# Patient Record
Sex: FEMALE | Race: WHITE | NOT HISPANIC OR LATINO | URBAN - METROPOLITAN AREA
[De-identification: names, ages, dates, MRNs, and addresses within clinical notes are randomized per-mention and may not be internally consistent; named-entity substitution may affect disease eponyms.]

---

## 2019-01-21 NOTE — IMPRESSION/PLAN
Impression: Type 2 diab w mild nonprlf diabetic rtnop w macular edema, right eye: M57.8740. OD Condition: improving. Symptoms: will continue to monitor. Plan: Discussed diagnosis in detail with patient. No treatment is required at this time. Discussed risks of progression. Emphasized blood sugar control. Will continue to observe condition and or symptoms. Call if [place] worsens.  OCT stable: decrease in CMT

## 2019-01-21 NOTE — IMPRESSION/PLAN
Impression: Type 2 diab w moderate nonprlf diab rtnop w macular edema, left eye: V83.9700. OS. Condition: established, stable. Vision: vision affected. Plan: Discussed diagnosis in detail with patient. No treatment is required at this time. Will continue to observe condition and or symptoms. Discussed risks of progression. Emphasized blood sugar control. OCT shows: stable OS.

## 2019-01-21 NOTE — IMPRESSION/PLAN
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 OD. Condition: established, stable. Vision: vision not affected. Plan: Discussed diagnosis in detail with patient. No treatment is required at this time, condition is not affecting the vision. Recommend observation for now.

## 2019-07-18 NOTE — IMPRESSION/PLAN
Impression: Type 2 diab w mild nonprlf diabetic rtnop w macular edema, right eye: P67.8335. OD Condition: improving. Symptoms: will continue to monitor. Plan: Discussed diagnosis in detail with patient. No treatment is required at this time. Discussed risks of progression. Emphasized blood sugar control. Will continue to observe condition and or symptoms. Call if [address] worsens.  OCT stable: decrease in CMT

## 2019-07-18 NOTE — IMPRESSION/PLAN
Impression: Type 2 diab w moderate nonprlf diab rtnop w macular edema, left eye: N02.0520. OS. Condition: established, stable. Vision: vision affected. Plan: Discussed diagnosis in detail with patient. No treatment is required at this time. Will continue to observe condition and or symptoms. Discussed risks of progression. Emphasized blood sugar control. OCT shows: stable OS.

## 2020-02-05 NOTE — IMPRESSION/PLAN
Impression: Type 2 diab w mild nonprlf diabetic rtnop w macular edema, right eye: W77.5622. OD Condition: stable. Vision: vision affected. Plan: Discussed diagnosis in detail with patient. No treatment is required at this time. Discussed risks of progression. Emphasized blood sugar control. Will continue to observe condition and or symptoms. Call if [address] worsens.  OCT stable: Stable no significant edema OD

## 2020-02-05 NOTE — IMPRESSION/PLAN
Impression: Type 2 diab w moderate nonprlf diab rtnop w macular edema, left eye: O88.5680. OS. Condition: established, stable. Vision: vision affected, stable. Plan: Discussed diagnosis in detail with patient. No treatment is required at this time. Will continue to observe condition and or symptoms. Discussed risks of progression. Emphasized blood sugar control. OCT shows: stable OS.

## 2020-08-19 NOTE — IMPRESSION/PLAN
Impression: Type 2 diab w mild nonprlf diabetic rtnop w macular edema, right eye: G63.0822. OD Condition: stable. Vision: vision affected. Plan: Due to Coronavirus COVID-19 pandemic and National Emergency, deferred Slit Lamp examination. Findings are based on OCT and Optos. OCT is stable mild ILM change, RP thickening centrally, decrease CMT and Optos shows stable no active stable ERM macular scar near fovea center. Based on diagnostic findings recommend a follow up in 6-8 months, sooner if there is a change or decrease in vision. Emphasized blood sugar control and advised to keep future appointments with PCP and/or Endocrinologist for the management of Diabetes.  Okay to schedule a refraction with optometrist.

## 2020-08-19 NOTE — IMPRESSION/PLAN
Impression: Type 2 diab w moderate nonprlf diab rtnop w macular edema, left eye: G06.4256. OS. Condition: established, stable. Vision: vision affected, stable. Plan: Due to Coronavirus COVID-19 pandemic and National Emergency, deferred Slit Lamp examination. Findings are based on OCT and Optos. OCT is stable OS and Optos shows stable no active edema or heme OS. Based on diagnostic findings recommend a follow up in 6-8 months, sooner if there is a change or decrease in vision. Emphasized blood sugar control and advised to keep future appointments with PCP and/or Endocrinologist for the management of Diabetes.  OKay to schedule a refraction with optometrist.

## 2021-04-30 NOTE — IMPRESSION/PLAN
Impression: Type 2 diab w moderate nonprlf diab rtnop w macular edema, left eye: F10.3607. OS. Condition: established, stable. Vision: vision affected, stable. Plan: Due to Coronavirus COVID-19 pandemic and National Emergency, deferred Slit Lamp examination. Findings are based on OCT and Optos. OCT and Optos shows no active edema - stable OS. Based on diagnostic findings recommend a follow up in 6 - 8 months, sooner if there is a change or decrease in vision. Emphasized blood sugar control and advised to keep future appointments with PCP and/or Endocrinologist for the management of Diabetes.

## 2021-04-30 NOTE — IMPRESSION/PLAN
Impression: Type 2 diab w mild nonprlf diabetic rtnop w macular edema, right eye: D20.6640. OD Condition: stable. Vision: vision affected. Plan: Due to Coronavirus COVID-19 pandemic and National Emergency, deferred Slit Lamp examination. Findings are based on OCT and Optos. OCT OD shows mild edema w/ILM change confirmed by Optos OD. Based on diagnostic findings recommend observation, follow up in 6 - 8 months, sooner if there is a change or decrease in vision. Emphasized blood sugar control and advised to keep future appointments with PCP and/or Endocrinologist for the management of Diabetes.

## 2021-11-10 NOTE — IMPRESSION/PLAN
Impression: Type 2 diab w moderate nonprlf diab rtnop w macular edema, left eye: D10.8511. OS. Condition: established, stable. Vision: vision affected, stable. s/p Map OS in 2015 Plan: Discussed diagnosis in detail with patient. Exam shows minimal Diabetic change OS. No treatment is recommended at this time. Emphasized blood sugar control and advised to keep future appointments with PCP and/or Endocrinologist for the management of Diabetes. Recommend observation for now. Will reassess the retina in 8 mos, sooner if there is a change or decrease in vision. OCT OS is stable, no edema and Optos OS shows no edema, focal laser tx - stable.

## 2021-11-10 NOTE — IMPRESSION/PLAN
Impression: Type 2 diab w mild nonprlf diabetic rtnop w macular edema, right eye: J81.5321. OD Condition: stable. Vision: vision affected. s/p Map OD in 2015 Plan: Discussed diagnosis in detail with patient. Exam shows minimal Diabetic change OD. No treatment is recommended at this time. Emphasized blood sugar control and advised to keep future appointments with PCP and/or Endocrinologist for the management of Diabetes. Recommend observation for now. Will reassess the retina in 8 mos, sooner if there is a change or decrease in vision. OCT shows mild edema w/ILM change and Optos OD shows mild edema w/ILM change, focal laser tx.

## 2022-07-26 NOTE — IMPRESSION/PLAN
Impression: Type 2 diab w mild nonprlf diabetic rtnop w macular edema, right eye: P38.6728. OD Condition: stable. Vision: vision affected. s/p Map OD in 2015 Plan: Discussed diagnosis in detail with patient. Exam shows no active Diabetic change OD. No treatment is recommended at this time. Emphasized blood sugar control and advised to keep future appointments with PCP and/or Endocrinologist for the management of Diabetes. Recommend observation for now. Will reassess the retina in 6 mos, sooner if there is a change or decrease in vision. OCT shows decrease in CMT - no edema OD and Optos OD shows previous focal laser tx, mop edema - stable.

## 2022-07-26 NOTE — IMPRESSION/PLAN
Impression: Type 2 diab w moderate nonprlf diab rtnop w macular edema, left eye: T82.1432. OS. Condition: established, stable. Vision: vision affected, stable. s/p Map OS in 2015 Plan: Discussed diagnosis in detail with patient. Exam shows no Diabetic change OS. No treatment is recommended at this time. Emphasized blood sugar control and advised to keep future appointments with PCP and/or Endocrinologist for the management of Diabetes. Recommend observation for now. Will reassess the retina in 6 mos, sooner if there is a change or decrease in vision. OCT OS is stable, no edema and Optos OS shows no edema, focal laser tx - stable.

## 2023-05-18 ENCOUNTER — OFFICE VISIT (OUTPATIENT)
Dept: URBAN - METROPOLITAN AREA CLINIC 23 | Facility: CLINIC | Age: 83
End: 2023-05-18
Payer: COMMERCIAL

## 2023-05-18 DIAGNOSIS — E11.3393 TYPE 2 DIAB WITH MOD NONP RTNOP WITHOUT MACULAR EDEMA, BI: Primary | ICD-10-CM

## 2023-05-18 PROCEDURE — 99213 OFFICE O/P EST LOW 20 MIN: CPT | Performed by: OPHTHALMOLOGY

## 2023-05-18 PROCEDURE — 92134 CPTRZ OPH DX IMG PST SGM RTA: CPT | Performed by: OPHTHALMOLOGY

## 2023-05-18 ASSESSMENT — INTRAOCULAR PRESSURE
OD: 10
OS: 10

## 2023-05-18 NOTE — IMPRESSION/PLAN
Impression: Type 2 diab with mod nonp rtnop without macular edema, bi: L59.8074. Bilateral. Condition: stable. Vision: stable. s/p Map OU in 2015 Plan: Discussed diagnosis in detail with patient. Exam shows no active Diabetic changes OU. No treatment is recommended at this time. Emphasized blood sugar control and advised to keep future appointments with PCP and/or Endocrinologist for the management of Diabetes. Recommend a retina follow - up in 9  mos, sooner if there is a change or decrease in vision. OCT shows the macula is stable, no edema OU and Optos shows previous focal laser tx, no edema OU.